# Patient Record
Sex: FEMALE | Race: WHITE | URBAN - NONMETROPOLITAN AREA
[De-identification: names, ages, dates, MRNs, and addresses within clinical notes are randomized per-mention and may not be internally consistent; named-entity substitution may affect disease eponyms.]

---

## 2020-12-01 ENCOUNTER — TELEPHONE (OUTPATIENT)
Dept: OBGYN CLINIC | Age: 40
End: 2020-12-01

## 2020-12-01 RX ORDER — ACETAMINOPHEN AND CODEINE PHOSPHATE 120; 12 MG/5ML; MG/5ML
1 SOLUTION ORAL DAILY
Qty: 84 TABLET | Refills: 2 | Status: SHIPPED | OUTPATIENT
Start: 2020-12-01

## 2020-12-01 RX ORDER — NORETHINDRONE 0.35 MG/1
TABLET ORAL
Qty: 28 TABLET | Refills: 12 | Status: SHIPPED | OUTPATIENT
Start: 2020-12-01

## 2020-12-01 NOTE — TELEPHONE ENCOUNTER
In 8/2020 I sent script with #90 and 4 refills. Should not need a refill now - please look into this.   Thanks

## 2020-12-01 NOTE — TELEPHONE ENCOUNTER
Received call from [facility] requesting 90d supply of Claudia for pt. Advised we sent them a rx for 90 day supply with 4 rf on 8/4/2020. They state that she has only been getting 1 pk at a time and never received the rx in August. OK to resend?

## 2023-05-08 ENCOUNTER — OFFICE VISIT (OUTPATIENT)
Dept: OBGYN CLINIC | Age: 43
End: 2023-05-08
Payer: COMMERCIAL

## 2023-05-08 ENCOUNTER — HOSPITAL ENCOUNTER (OUTPATIENT)
Age: 43
Setting detail: SPECIMEN
Discharge: HOME | End: 2023-05-08

## 2023-05-08 VITALS
SYSTOLIC BLOOD PRESSURE: 110 MMHG | DIASTOLIC BLOOD PRESSURE: 70 MMHG | HEIGHT: 69 IN | WEIGHT: 143 LBS | BODY MASS INDEX: 21.18 KG/M2

## 2023-05-08 DIAGNOSIS — N63.20 BILATERAL BREAST LUMP: ICD-10-CM

## 2023-05-08 DIAGNOSIS — N63.10 BILATERAL BREAST LUMP: ICD-10-CM

## 2023-05-08 DIAGNOSIS — Z01.411 ABNORMAL FEMALE PELVIC EXAM: Primary | ICD-10-CM

## 2023-05-08 DIAGNOSIS — Z12.4 SCREENING FOR CERVICAL CANCER: ICD-10-CM

## 2023-05-08 PROCEDURE — 99396 PREV VISIT EST AGE 40-64: CPT | Performed by: ADVANCED PRACTICE MIDWIFE

## 2023-05-08 RX ORDER — ALBUTEROL SULFATE 90 UG/1
2 AEROSOL, METERED RESPIRATORY (INHALATION) EVERY 4 HOURS
COMMUNITY
Start: 2014-01-13

## 2023-05-08 ASSESSMENT — ENCOUNTER SYMPTOMS
RESPIRATORY NEGATIVE: 1
DIARRHEA: 0
CONSTIPATION: 0
SHORTNESS OF BREATH: 0
ABDOMINAL PAIN: 0
GASTROINTESTINAL NEGATIVE: 1

## 2023-05-08 NOTE — PROGRESS NOTES
YEARLY PHYSICAL    Date of service: 2023    Qian Score  Is a 44 y.o.   female    PT's PCP is: Jefferson Hernandez DO     : 1980                                             Subjective:       Patient's last menstrual period was 2023 (exact date).      Are your menses regular: yes    OB History    Para Term  AB Living   1 1 1     1   SAB IAB Ectopic Molar Multiple Live Births             1      # Outcome Date GA Lbr Royce/2nd Weight Sex Delivery Anes PTL Lv   1 Term 18 40w0d  9 lb (4.082 kg) F CS-LTranv EPI  CHERYL        Social History     Tobacco Use   Smoking Status Never   Smokeless Tobacco Never        Social History     Substance and Sexual Activity   Alcohol Use Yes    Alcohol/week: 2.0 standard drinks    Types: 2 Cans of beer per week       Family History   Problem Relation Age of Onset    Heart Disease Paternal Grandfather     Prostate Cancer Paternal Grandfather     Alzheimer's Disease Paternal Grandfather     Heart Disease Paternal Grandmother     Stroke Maternal Grandmother     Prostate Cancer Father     Hypertension Mother     Diabetes Maternal Aunt        Any family history of breast or ovarian cancer: No    Any family history of blood clots: No      Allergies: Penicillin v      Current Outpatient Medications:     albuterol sulfate HFA (PROVENTIL;VENTOLIN;PROAIR) 108 (90 Base) MCG/ACT inhaler, Inhale 2 puffs into the lungs every 4 hours, Disp: , Rfl:     Social History     Substance and Sexual Activity   Sexual Activity Yes    Partners: Male       Any bleeding or pain with intercourse: No    Last Yearly:  2020    Last pap: 2019 - normal    Last Mammogram:  - had biopsy and needs further surgery to remove    Do you do self breast exams: Encouraged    Past Medical History:   Diagnosis Date    Asthma     Bladder infection, chronic        Past Surgical History:   Procedure Laterality Date    WISDOM TOOTH

## 2023-05-16 LAB — CYTOLOGY REPORT: NORMAL

## 2023-06-14 ENCOUNTER — TELEPHONE (OUTPATIENT)
Dept: OBGYN CLINIC | Age: 43
End: 2023-06-14

## 2024-05-13 ENCOUNTER — OFFICE VISIT (OUTPATIENT)
Dept: OBGYN CLINIC | Age: 44
End: 2024-05-13
Payer: COMMERCIAL

## 2024-05-13 VITALS
HEIGHT: 69 IN | BODY MASS INDEX: 21.33 KG/M2 | DIASTOLIC BLOOD PRESSURE: 60 MMHG | SYSTOLIC BLOOD PRESSURE: 110 MMHG | WEIGHT: 144 LBS

## 2024-05-13 DIAGNOSIS — Z12.72 SMEAR, VAGINAL, AS PART OF ROUTINE GYNECOLOGICAL EXAMINATION: Primary | ICD-10-CM

## 2024-05-13 DIAGNOSIS — Z01.419 SMEAR, VAGINAL, AS PART OF ROUTINE GYNECOLOGICAL EXAMINATION: Primary | ICD-10-CM

## 2024-05-13 PROCEDURE — 99396 PREV VISIT EST AGE 40-64: CPT | Performed by: ADVANCED PRACTICE MIDWIFE

## 2024-05-13 RX ORDER — DILTIAZEM HYDROCHLORIDE 60 MG/1
TABLET, FILM COATED ORAL
COMMUNITY
Start: 2024-03-05

## 2024-05-13 ASSESSMENT — ENCOUNTER SYMPTOMS
RESPIRATORY NEGATIVE: 1
CONSTIPATION: 0
SHORTNESS OF BREATH: 0
DIARRHEA: 0
ABDOMINAL PAIN: 0
GASTROINTESTINAL NEGATIVE: 1

## 2024-05-13 NOTE — PROGRESS NOTES
YEARLY PHYSICAL    Date of service: 2024    Kayleen Padilla  Is a 44 y.o.   female    PT's PCP is: Joseph Terrazas DO     : 1980                                             Subjective:       Patient's last menstrual period was 2024 (approximate).     Are your menses regular: yes    OB History    Para Term  AB Living   1 1 1     1   SAB IAB Ectopic Molar Multiple Live Births             1      # Outcome Date GA Lbr Royce/2nd Weight Sex Delivery Anes PTL Lv   1 Term 18 40w0d  4.082 kg (9 lb) F CS-LTranv EPI  CHERYL        Social History     Tobacco Use   Smoking Status Never   Smokeless Tobacco Never        Social History     Substance and Sexual Activity   Alcohol Use Yes    Alcohol/week: 2.0 standard drinks of alcohol    Types: 2 Cans of beer per week       Family History   Problem Relation Age of Onset    Heart Disease Paternal Grandfather     Prostate Cancer Paternal Grandfather     Alzheimer's Disease Paternal Grandfather     Heart Disease Paternal Grandmother     Stroke Maternal Grandmother     Prostate Cancer Father         3 times, moved to [facility]    Hypertension Mother     Diabetes Maternal Aunt        Any family history of breast or ovarian cancer: Yes - father with breast cancer (mets from prostate)    Any family history of blood clots: No      Allergies: Penicillin v      Current Outpatient Medications:     SYMBICORT 80-4.5 MCG/ACT AERO, , Disp: , Rfl:     albuterol sulfate HFA (PROVENTIL;VENTOLIN;PROAIR) 108 (90 Base) MCG/ACT inhaler, Inhale 2 puffs into the lungs every 4 hours, Disp: , Rfl:     Social History     Substance and Sexual Activity   Sexual Activity Yes    Partners: Male       Any bleeding or pain with intercourse: No    Last Yearly:      Last pap:  - normal    Last HPV:  - negative    Last Mammogram: 2024 - had breast biopsy, benign    Do you do self breast exams:

## 2024-11-19 ENCOUNTER — TELEPHONE (OUTPATIENT)
Dept: OBGYN CLINIC | Age: 44
End: 2024-11-19

## 2024-11-19 ENCOUNTER — HOSPITAL ENCOUNTER (OUTPATIENT)
Age: 44
Setting detail: SPECIMEN
Discharge: HOME | End: 2024-11-19

## 2024-11-19 DIAGNOSIS — N93.9 ABNORMAL UTERINE BLEEDING: Primary | ICD-10-CM

## 2024-11-19 DIAGNOSIS — N93.9 ABNORMAL UTERINE BLEEDING: ICD-10-CM

## 2024-11-19 LAB
B-HCG SERPL EIA 3RD IS-ACNC: <0.2 MIU/ML
BASOPHILS # BLD: 0.06 K/UL (ref 0–0.2)
BASOPHILS NFR BLD: 1 % (ref 0–2)
EOSINOPHIL # BLD: 0.07 K/UL (ref 0–0.44)
EOSINOPHILS RELATIVE PERCENT: 1 % (ref 1–4)
ERYTHROCYTE [DISTWIDTH] IN BLOOD BY AUTOMATED COUNT: 13.9 % (ref 11.8–14.4)
HCT VFR BLD AUTO: 38.6 % (ref 36.3–47.1)
HGB BLD-MCNC: 12.5 G/DL (ref 11.9–15.1)
IMM GRANULOCYTES # BLD AUTO: 0.03 K/UL (ref 0–0.3)
IMM GRANULOCYTES NFR BLD: 0 %
LYMPHOCYTES NFR BLD: 1.96 K/UL (ref 1.1–3.7)
LYMPHOCYTES RELATIVE PERCENT: 23 % (ref 24–43)
MCH RBC QN AUTO: 28.2 PG (ref 25.2–33.5)
MCHC RBC AUTO-ENTMCNC: 32.4 G/DL (ref 28.4–34.8)
MCV RBC AUTO: 86.9 FL (ref 82.6–102.9)
MONOCYTES NFR BLD: 0.72 K/UL (ref 0.1–1.2)
MONOCYTES NFR BLD: 8 % (ref 3–12)
NEUTROPHILS NFR BLD: 67 % (ref 36–65)
NEUTS SEG NFR BLD: 5.76 K/UL (ref 1.5–8.1)
NRBC BLD-RTO: 0 PER 100 WBC
PLATELET # BLD AUTO: 295 K/UL (ref 138–453)
PMV BLD AUTO: 9.4 FL (ref 8.1–13.5)
RBC # BLD AUTO: 4.44 M/UL (ref 3.95–5.11)
WBC OTHER # BLD: 8.6 K/UL (ref 3.5–11.3)

## 2024-11-19 NOTE — TELEPHONE ENCOUNTER
There are no ultrasound openings on Thursday earlier than 3:30PM so she would be unable to get done prior to seeing you.    She denies any symptoms of vaginal infection or any new partners.  She is under a lot of stress as she is starting a new job this week and being away last week on vacation also caused added stress.  She did try to diet for just 2-3 week prior to the [place] vacation as well. She plans to get CBC drawn today and will f/u on Thursday.

## 2024-11-19 NOTE — TELEPHONE ENCOUNTER
Patient called. She reports LMP date of 11-2. She has been bleeding since then, for 17 days now. She was in [place] last week or would have called sooner.  She just spoke to the  and they gave her an appointment for December 3rd. Patient then asked to speak with nursing staff as she is concerned about wanting to get in sooner.  I rescheduled patient for this Thursday morning at 11:00.  Patient reports that bleeding is \"pretty consistent\" period like bright red, moderate to heavy.  Changing pad or tampon every 2-3 hours.  Patient advised that if bleeding increases and she is ever changing more than a pad/ hour she is to go to ER.

## 2024-11-19 NOTE — TELEPHONE ENCOUNTER
Can she get CBC for H/H and HCG level before Thursday so have them to review and determine mgmt steps?  May need pelvic USN done also - are there any openings Thursday for this to be done or no?  Any vaginal itch/burn/odor.  Monogamous relationship correct?  Any other changes - diet, lifestyle, stress, illness?

## 2024-11-20 SDOH — ECONOMIC STABILITY: FOOD INSECURITY: WITHIN THE PAST 12 MONTHS, THE FOOD YOU BOUGHT JUST DIDN'T LAST AND YOU DIDN'T HAVE MONEY TO GET MORE.: NEVER TRUE

## 2024-11-20 SDOH — ECONOMIC STABILITY: TRANSPORTATION INSECURITY
IN THE PAST 12 MONTHS, HAS LACK OF TRANSPORTATION KEPT YOU FROM MEETINGS, WORK, OR FROM GETTING THINGS NEEDED FOR DAILY LIVING?: NO

## 2024-11-20 SDOH — ECONOMIC STABILITY: FOOD INSECURITY: WITHIN THE PAST 12 MONTHS, YOU WORRIED THAT YOUR FOOD WOULD RUN OUT BEFORE YOU GOT MONEY TO BUY MORE.: NEVER TRUE

## 2024-11-20 SDOH — ECONOMIC STABILITY: INCOME INSECURITY: HOW HARD IS IT FOR YOU TO PAY FOR THE VERY BASICS LIKE FOOD, HOUSING, MEDICAL CARE, AND HEATING?: NOT HARD AT ALL

## 2024-11-20 ASSESSMENT — PATIENT HEALTH QUESTIONNAIRE - PHQ9
SUM OF ALL RESPONSES TO PHQ9 QUESTIONS 1 & 2: 0
SUM OF ALL RESPONSES TO PHQ QUESTIONS 1-9: 0
SUM OF ALL RESPONSES TO PHQ QUESTIONS 1-9: 0
2. FEELING DOWN, DEPRESSED OR HOPELESS: NOT AT ALL
SUM OF ALL RESPONSES TO PHQ QUESTIONS 1-9: 0
1. LITTLE INTEREST OR PLEASURE IN DOING THINGS: NOT AT ALL
2. FEELING DOWN, DEPRESSED OR HOPELESS: NOT AT ALL
1. LITTLE INTEREST OR PLEASURE IN DOING THINGS: NOT AT ALL
SUM OF ALL RESPONSES TO PHQ QUESTIONS 1-9: 0
SUM OF ALL RESPONSES TO PHQ9 QUESTIONS 1 & 2: 0

## 2024-11-21 ENCOUNTER — OFFICE VISIT (OUTPATIENT)
Dept: OBGYN CLINIC | Age: 44
End: 2024-11-21
Payer: COMMERCIAL

## 2024-11-21 ENCOUNTER — HOSPITAL ENCOUNTER (OUTPATIENT)
Age: 44
Setting detail: SPECIMEN
Discharge: HOME | End: 2024-11-21

## 2024-11-21 VITALS
SYSTOLIC BLOOD PRESSURE: 100 MMHG | DIASTOLIC BLOOD PRESSURE: 70 MMHG | HEIGHT: 69 IN | WEIGHT: 145 LBS | BODY MASS INDEX: 21.48 KG/M2

## 2024-11-21 DIAGNOSIS — N93.9 ABNORMAL UTERINE BLEEDING: ICD-10-CM

## 2024-11-21 DIAGNOSIS — N84.1 CERVICAL POLYP: ICD-10-CM

## 2024-11-21 DIAGNOSIS — N93.9 ABNORMAL UTERINE BLEEDING: Primary | ICD-10-CM

## 2024-11-21 PROCEDURE — 99214 OFFICE O/P EST MOD 30 MIN: CPT | Performed by: ADVANCED PRACTICE MIDWIFE

## 2024-11-21 RX ORDER — NORETHINDRONE 5 MG/1
TABLET ORAL
Qty: 64 TABLET | Refills: 0 | Status: SHIPPED | OUTPATIENT
Start: 2024-11-21

## 2024-11-21 ASSESSMENT — ENCOUNTER SYMPTOMS
RESPIRATORY NEGATIVE: 1
GASTROINTESTINAL NEGATIVE: 1

## 2024-11-21 NOTE — PROGRESS NOTES
PROBLEM VISIT     Date of service: 2024    Kayleen MCKENZIE Valerie  Is a 44 y.o.  female    PT's PCP is: Joseph Terrazas DO     : 1980                                          HPI  Here for AUB.  Menses started 2024 which was proper time of regular cycle.  However, cycle usually only lasts 4-6 days and is heavy on day 2.  However this cycle was lighter on day 2 and then got heavier.  Cycle has remained regular moderate flow to heavy for 2 weeks.  Admits that very stressed - changing jobs at [employer] and recently traveled with spouse to [place].  [place] in [place] or .  Tried to lose weight before vacation - only lost 2#.  Dime size clots present intermittently.  Denies vaginal itch/burn/odor.  Denies pelvic pain.  Monogamous relationship.     Review of Systems   Constitutional: Negative.    HENT: Negative.     Respiratory: Negative.     Gastrointestinal: Negative.    Genitourinary:  Positive for menstrual problem and vaginal bleeding. Negative for dysuria, pelvic pain, vaginal discharge and vaginal pain.   Neurological: Negative.    Psychiatric/Behavioral: Negative.         No LMP recorded.   OB History    Para Term  AB Living   1 1 1     1   SAB IAB Ectopic Molar Multiple Live Births             1      # Outcome Date GA Lbr Royce/2nd Weight Sex Type Anes PTL Lv   1 Term 18 40w0d  4.082 kg (9 lb) F CS-LTranv EPI  CHERYL        Social History     Tobacco Use   Smoking Status Never   Smokeless Tobacco Never        Social History     Substance and Sexual Activity   Alcohol Use Yes    Alcohol/week: 6.0 standard drinks of alcohol    Types: 4 Glasses of wine, 2 Cans of beer per week       Allergies: Penicillin v      Current Outpatient Medications:     norethindrone (AYGESTIN) 5 MG tablet, Take 2 tablets every hour until spotting but only to 6 doses.  Then take 2 tablet three times a day for 2 days.  Then 2 tablets twice daily for 2 days.  Then 2 tablets daily for 16 days., Disp: 64

## 2024-11-22 LAB
CANDIDA SPECIES: NEGATIVE
GARDNERELLA VAGINALIS: NEGATIVE
SOURCE: NORMAL
TRICHOMONAS: NEGATIVE

## 2024-11-25 LAB — SURGICAL PATHOLOGY REPORT: NORMAL

## 2024-12-09 ENCOUNTER — OFFICE VISIT (OUTPATIENT)
Dept: OBGYN CLINIC | Age: 44
End: 2024-12-09
Payer: COMMERCIAL

## 2024-12-09 ENCOUNTER — TELEPHONE (OUTPATIENT)
Dept: OBGYN CLINIC | Age: 44
End: 2024-12-09

## 2024-12-09 VITALS
SYSTOLIC BLOOD PRESSURE: 100 MMHG | WEIGHT: 145 LBS | HEIGHT: 69 IN | DIASTOLIC BLOOD PRESSURE: 60 MMHG | BODY MASS INDEX: 21.48 KG/M2

## 2024-12-09 DIAGNOSIS — N93.9 ABNORMAL UTERINE BLEEDING (AUB): Primary | ICD-10-CM

## 2024-12-09 DIAGNOSIS — N93.9 ABNORMAL UTERINE BLEEDING: Primary | ICD-10-CM

## 2024-12-09 PROCEDURE — 99213 OFFICE O/P EST LOW 20 MIN: CPT | Performed by: ADVANCED PRACTICE MIDWIFE

## 2024-12-09 ASSESSMENT — ENCOUNTER SYMPTOMS
RESPIRATORY NEGATIVE: 1
GASTROINTESTINAL NEGATIVE: 1

## 2024-12-23 NOTE — TELEPHONE ENCOUNTER
Spoke with Nerissa at [facility]. She states they are out of network with pt insurance and the prescription needs to go to [facility]. [facility].

## 2024-12-26 NOTE — TELEPHONE ENCOUNTER
Patient's shipment to office alert notification fax received from [facility].

## 2025-01-06 NOTE — TELEPHONE ENCOUNTER
Patient informed that device is here.  She was instructed to call the office on cycle day 1 to schedule insert for cycle day 5-9. She is aware the SPT will be needed day prior to insertion.

## 2025-01-15 ENCOUNTER — HOSPITAL ENCOUNTER (OUTPATIENT)
Age: 45
Setting detail: SPECIMEN
Discharge: HOME | End: 2025-01-15

## 2025-01-15 DIAGNOSIS — N93.9 ABNORMAL UTERINE BLEEDING: ICD-10-CM

## 2025-01-15 LAB — HCG SERPL QL: NEGATIVE

## 2025-01-16 ENCOUNTER — HOSPITAL ENCOUNTER (OUTPATIENT)
Age: 45
Setting detail: SPECIMEN
Discharge: HOME | End: 2025-01-16

## 2025-01-16 ENCOUNTER — PROCEDURE VISIT (OUTPATIENT)
Dept: OBGYN CLINIC | Age: 45
End: 2025-01-16
Payer: COMMERCIAL

## 2025-01-16 VITALS
SYSTOLIC BLOOD PRESSURE: 112 MMHG | DIASTOLIC BLOOD PRESSURE: 80 MMHG | HEIGHT: 69 IN | WEIGHT: 146 LBS | BODY MASS INDEX: 21.62 KG/M2

## 2025-01-16 DIAGNOSIS — Z11.3 ROUTINE SCREENING FOR STI (SEXUALLY TRANSMITTED INFECTION): ICD-10-CM

## 2025-01-16 DIAGNOSIS — N92.6 ENCOUNTER FOR MANAGEMENT OF MENSTRUAL ISSUE: Primary | ICD-10-CM

## 2025-01-16 DIAGNOSIS — Z30.430 ENCOUNTER FOR IUD INSERTION: ICD-10-CM

## 2025-01-16 LAB
C TRACH DNA SPEC QL PROBE+SIG AMP: NORMAL
N GONORRHOEA DNA SPEC QL PROBE+SIG AMP: NORMAL
SPECIMEN DESCRIPTION: NORMAL

## 2025-01-16 PROCEDURE — 58300 INSERT INTRAUTERINE DEVICE: CPT | Performed by: ADVANCED PRACTICE MIDWIFE

## 2025-01-16 RX ORDER — LEVONORGESTREL 52 MG/1
INTRAUTERINE DEVICE INTRAUTERINE
COMMUNITY
Start: 2025-01-16

## 2025-01-16 NOTE — PROGRESS NOTES
The patient is a 44 y.o. female that presents for IUD insertion for  menses management.  Was having prolonged heavy bleeding  Consents to GC/CT screening with IUD insertion    OB History          1    Para   1    Term   1            AB        Living   1         SAB        IAB        Ectopic        Molar        Multiple        Live Births   1                Allergies: Penicillin v    Vitals: Blood pressure 112/80, height 1.753 m (5' 9\"), weight 66.2 kg (146 lb), last menstrual period 2025.    Premedicated with Motrin 800 mg: No    Cytotec 200mcg:No    SPT: negative    Consent signed:  Yes    PROCEDURE:    Mirena, Lot # [id number], Expiration date 2027, NDC 86867-982-65    Bimanual exam: anteverted    Cervix cleansed with: Hibiclens-Alcoholx3    Tenaculum applied: No     Uterus sounded: 8cm    Mirena inserted without difficulty. IUD strings trimmed to 3 cm.     Assessment:   Diagnosis Orders   1. Encounter for management of menstrual issue        2. Encounter for IUD insertion              Plan:  Education on IUD  Abstain from intercourse for 72 hours  Motrin 800 mg Q 8 hours PRN  RTO one month for string check.  See MAR for lot # and NDC #    Return in about 1 month (around 2025).    LUCIA BOLANOS, SAVANAH - TABITHA,2025 7:46 AM

## 2025-01-20 LAB
C TRACH DNA SPEC QL PROBE+SIG AMP: NEGATIVE
N GONORRHOEA DNA SPEC QL PROBE+SIG AMP: NEGATIVE
SPECIMEN DESCRIPTION: NORMAL

## 2025-02-03 ENCOUNTER — TELEPHONE (OUTPATIENT)
Dept: OBGYN CLINIC | Age: 45
End: 2025-02-03

## 2025-02-03 NOTE — TELEPHONE ENCOUNTER
I would recommend she monitor at this time especially since bleeding is not very heavy.  Sure it can be inconvenient but irregular bleeding is not uncommon at all initially and possibly off/on for the first few months.

## 2025-02-03 NOTE — TELEPHONE ENCOUNTER
Patient called stating that she had an IUD inserted approximately 3 weeks ago.  She is currently on day 8 of her cycle and there does not appear to be an end in sight.  We did discuss that with an IUD, you can experience irregular spotting/cycles for the first six months.  The bleeding is bright red in color and she is experiencing cramping.  The bleeding is mostly when using the restroom and denies that it is very heavy.  She is also noticing normal mood swings with this cycle.  She states that she had an episode of heavy bleeding approximately 2 months ago and had a  Rx for Aygestin sent to the pharmacy.  The bleeding slowed down before the pharmacy was able to dispense the Rx.  She is unsure if she needs to start the Aygestin for this bleeding or if you had any further recommendations?

## 2025-02-03 NOTE — TELEPHONE ENCOUNTER
Spoke to patient and reviewed Linnette's response.  Patient verbalized understanding, no further questions/concerns voiced.  Will keep her visit for later this month.

## 2025-02-19 ASSESSMENT — ENCOUNTER SYMPTOMS
GASTROINTESTINAL NEGATIVE: 1
RESPIRATORY NEGATIVE: 1

## 2025-02-20 ENCOUNTER — OFFICE VISIT (OUTPATIENT)
Dept: OBGYN CLINIC | Age: 45
End: 2025-02-20
Payer: COMMERCIAL

## 2025-02-20 VITALS
HEIGHT: 69 IN | BODY MASS INDEX: 22.22 KG/M2 | WEIGHT: 150 LBS | DIASTOLIC BLOOD PRESSURE: 70 MMHG | SYSTOLIC BLOOD PRESSURE: 100 MMHG

## 2025-02-20 DIAGNOSIS — N92.6 ENCOUNTER FOR MANAGEMENT OF MENSTRUAL ISSUE: ICD-10-CM

## 2025-02-20 DIAGNOSIS — Z30.431 INTRAUTERINE DEVICE SURVEILLANCE: Primary | ICD-10-CM

## 2025-02-20 PROCEDURE — 99213 OFFICE O/P EST LOW 20 MIN: CPT | Performed by: ADVANCED PRACTICE MIDWIFE

## 2025-02-20 NOTE — PROGRESS NOTES
PROBLEM VISIT     Date of service: 2025    Kayleen Padilla  Is a 44 y.o.  female    PT's PCP is: Joseph Terrazas DO     : 1980                                          HPI Here for string check following IUD insertion on 2025.  Goal of IUD use - menses management as prior to IUD insert she experienced heavy prolonged bleeding.  Since IUD insertion - bleeding is decreasing over time but flow was variable over the last few weeks, denies pelvic pain, denies issues with intercourse.      Review of Systems   Constitutional: Negative.    HENT: Negative.     Respiratory: Negative.     Gastrointestinal: Negative.    Genitourinary:  Positive for menstrual problem (managed with IUD).   Musculoskeletal: Negative.    Neurological: Negative.    Psychiatric/Behavioral: Negative.         No LMP recorded.   OB History    Para Term  AB Living   1 1 1     1   SAB IAB Ectopic Molar Multiple Live Births             1      # Outcome Date GA Lbr Royce/2nd Weight Sex Type Anes PTL Lv   1 Term 18 40w0d  4.082 kg (9 lb) F CS-LTranv EPI  CHERYL        Social History     Tobacco Use   Smoking Status Never   Smokeless Tobacco Never        Social History     Substance and Sexual Activity   Alcohol Use Yes    Alcohol/week: 6.0 standard drinks of alcohol    Types: 4 Glasses of wine, 2 Cans of beer per week       Allergies: Penicillin v      Current Outpatient Medications:     MIRENA, 52 MG, IUD 52 mg, , Disp: , Rfl:     SYMBICORT 80-4.5 MCG/ACT AERO, as needed, Disp: , Rfl:     albuterol sulfate HFA (PROVENTIL;VENTOLIN;PROAIR) 108 (90 Base) MCG/ACT inhaler, Inhale 2 puffs into the lungs every 6 hours as needed, Disp: , Rfl:     Social History     Substance and Sexual Activity   Sexual Activity Yes    Partners: Male       No chief complaint on file.       Physical Exam  Constitutional:       Appearance: Normal appearance. She is normal weight.   Genitourinary:      No vaginal discharge, erythema, tenderness

## 2025-05-16 NOTE — PROGRESS NOTES
YEARLY PHYSICAL    Date of service: 2025    Kayleen MCKENZIE Valerie  Is a 45 y.o.   female    PT's PCP is: Joseph Terrazas DO     : 1980                                             Subjective:       No LMP recorded. (Menstrual status: IUD).     Are your menses regular: no    OB History    Para Term  AB Living   1 1 1   1   SAB IAB Ectopic Molar Multiple Live Births        1      # Outcome Date GA Lbr Royce/2nd Weight Sex Type Anes PTL Lv   1 Term 18 40w0d  4.082 kg (9 lb) F CS-LTranv EPI  CHERYL        Social History     Tobacco Use   Smoking Status Never   Smokeless Tobacco Never        Social History     Substance and Sexual Activity   Alcohol Use Yes    Alcohol/week: 6.0 standard drinks of alcohol    Types: 4 Glasses of wine, 2 Cans of beer per week       Family History   Problem Relation Age of Onset    Heart Disease Paternal Grandfather     Prostate Cancer Paternal Grandfather     Alzheimer's Disease Paternal Grandfather     Heart Disease Paternal Grandmother     Stroke Maternal Grandmother     Prostate Cancer Father         3 times, moved to [place]    Breast Cancer Father         Mets from prostate    Hypertension Mother     Diabetes Maternal Aunt        Any family history of breast or ovarian cancer: Yes    Any family history of blood clots: No      Allergies: Penicillin v      Current Outpatient Medications:     MIRENA, 52 MG, IUD 52 mg, , Disp: , Rfl:     SYMBICORT 80-4.5 MCG/ACT AERO, as needed, Disp: , Rfl:     albuterol sulfate HFA (PROVENTIL;VENTOLIN;PROAIR) 108 (90 Base) MCG/ACT inhaler, Inhale 2 puffs into the lungs every 6 hours as needed, Disp: , Rfl:     Social History     Substance and Sexual Activity   Sexual Activity Yes    Partners: Male       Any bleeding or pain with intercourse: No    Last Yearly:      Last pap:  - negative    Last HPV: Due    Last Mammogram: 2024    Do you do self breast exams:

## 2025-05-19 ENCOUNTER — OFFICE VISIT (OUTPATIENT)
Dept: OBGYN CLINIC | Age: 45
End: 2025-05-19
Payer: COMMERCIAL

## 2025-05-19 VITALS
HEIGHT: 69 IN | WEIGHT: 151 LBS | DIASTOLIC BLOOD PRESSURE: 80 MMHG | BODY MASS INDEX: 22.36 KG/M2 | SYSTOLIC BLOOD PRESSURE: 120 MMHG

## 2025-05-19 DIAGNOSIS — Z01.419 VISIT FOR GYNECOLOGIC EXAMINATION: Primary | ICD-10-CM

## 2025-05-19 DIAGNOSIS — Z91.89 AT INCREASED RISK FOR MALIGNANT NEOPLASM OF BREAST: ICD-10-CM

## 2025-05-19 DIAGNOSIS — Z12.4 SCREENING FOR CERVICAL CANCER: ICD-10-CM

## 2025-05-19 DIAGNOSIS — Z30.431 INTRAUTERINE DEVICE SURVEILLANCE: ICD-10-CM

## 2025-05-19 PROCEDURE — 99396 PREV VISIT EST AGE 40-64: CPT | Performed by: ADVANCED PRACTICE MIDWIFE

## 2025-05-20 ENCOUNTER — HOSPITAL ENCOUNTER (OUTPATIENT)
Age: 45
Setting detail: SPECIMEN
Discharge: HOME | End: 2025-05-20

## 2025-05-22 ENCOUNTER — RESULTS FOLLOW-UP (OUTPATIENT)
Dept: OBGYN CLINIC | Age: 45
End: 2025-05-22

## 2025-05-22 LAB
HPV I/H RISK 4 DNA CVX QL NAA+PROBE: NOT DETECTED
HPV SAMPLE: NORMAL
HPV, INTERPRETATION: NORMAL
HPV16 DNA CVX QL NAA+PROBE: NOT DETECTED
HPV18 DNA CVX QL NAA+PROBE: NOT DETECTED
SPECIMEN DESCRIPTION: NORMAL

## 2025-05-29 LAB — CYTOLOGY REPORT: NORMAL
